# Patient Record
(demographics unavailable — no encounter records)

---

## 2024-10-22 NOTE — HISTORY OF PRESENT ILLNESS
[FreeTextEntry1] : Patient is a 80-year-old female who presents in my office today for chronic low back pain as well as paresthesia involving her bilateral feet.  Patient reports symptoms increased December 2023.  Patient reports she followed up with a neurologist and underwent nerve conduction studies as well as nerve biopsies to the best of her knowledge results were unremarkable.  She reports that she was diagnosed with idiopathic peripheral neuropathy and was placed on gabapentin.  She reports some improvement with gabapentin.  Patient reports she underwent lumbar PRATIK this past spring which gave her some temporary relief.  Patient also reports that she was evaluated by Dr. Church MRI studies of the lumbar spine were obtained surgery was discussed however patient declines that option at this time.  She is currently under the care of a chiropractor and is undergoing spinal decompression which she also reports is providing some relief.  Patient continues to complain of low back pain, with paresthesia involving her bilateral feet.  She reports symptoms are aggravated with prolonged sitting standing and ambulation.  Patient rates her pain level at a 4 out of 10.  Patient presents to my office today for evaluation of acupuncture for treatment of her LS and peripheral neuropathy complaints.

## 2024-10-22 NOTE — PHYSICAL EXAM
[FreeTextEntry1] : EXAMINATION OF LUMBAR SPINE & LOWER EXTREMITIES:   No gross atrophy   Reflexes (R) L/E:                   Quadriceps 2                   Achilles diminshed  Reflexes (L) L/E:                    Quadriceps 2                    Achilles diminished    Sensory L/E: decrease stocking distribution involving the bilateral feet.       Good peripheral Pulses Bilateral L/E No skin changes No atrophy to the intrinsic musculature of the feet noted Tinel's and ankle is negative bilaterally Testing: Patricks (R) [- ]               Patricks (L) [- ]               Babinski [ down going bilaterally]               Chaddock [- bilaterally]               Oppenheim [ -bilaterally]               Gonda [- bilaterally]               Clonus [- ankle bilaterally]               Leseagues (R) [- ]               Leseagues (L) [- ]               Kemps [- ]  SI Jt Lig.Challenege Test (R) -  SI Jt Lig.Challenege Test (L) - S.L.R. ( R ) -60 degrees with tight hamstrings S.L.R. ( L )-60 degrees with tight hamstrings  Range of motion testing was performed with the use of a goniometer.                           Flexion: 55 degrees (normal - 75-90)                           Extension: 15 degrees (normal - 30)                           Lateral Bending ( R ): 30 degrees (normal - 35)                           Lateral Bending ( L ): 25 degrees (normal - 35)                           Thoracic Rotation ( R ):30 degrees (normal - 35)                           Thoracic Rotation ( L ): 30 degrees (normal - 35)                           Internal Rotation Femur ( R ): WNL                           External Rotation Femur ( R ): WNL                           Internal Rotation Femur ( L ):WNL                           External Rotation Femur ( L ): WNL  Normal gait Patient able to perform total heel ambulation. Rhomberg test negative Vibratory: intact Proprioception: intact MMT: intact  Palpation of the Lumbar Spine: Tenderness involving the L4/L5 and L5/S1 interspace. Tenderness and moderate spasm involving the bilateral lower lumbar paraspinal musculature. Trigger points involving the bilateral gluteal musculature.

## 2024-10-22 NOTE — END OF VISIT
[FreeTextEntry3] :  The following information has been documented by Keke Medina acting as a scribe. The documentation recorded by the scribe, in my presence, accurately reflects the service I, Dr. Hernandez, personally performed, and the decisions made by me with my edits as appropriate.  This note was generated by using Dragon medical dictation software. A reasonable effort has been made for proofreading its contents, but typos may still remain. If there are any questions or points of clarification needed, please notify my office.

## 2024-10-22 NOTE — ASSESSMENT
[FreeTextEntry1] : Follow up with Neurologist and continue with Gabapentin as per Neurologist 1xweek/8weeks acupuncture- L/S Goals of which are to decrease pain, dissipate muscle spasm, increase ROM and improve level of function. Home exercise plan reviewed with patient. Stressed the importance for the need for frequent change in position from sit to stand and stand to sit, as well as use of weight shifting techniques to alleviate low back discomfort. Instruction in proper posturing, body mechanics and lifting techniques. Recheck in 4 to 6 weeks At least 60 minutes was spent with patient face to face examining patient, reviewing findings/results, counseling patient and coordinating treatment program. Ample time was provided to answer any questions or address concerns to the patients satisfaction.

## 2024-11-19 NOTE — PROCEDURE
[de-identified] : Acupuncture: Baldry technique with multiple needle placement and UV lamp to the lumbar paraspinal and gluteal musculature x 45 minutes Paralumbar chain (Bladder meridian) with ES x 45 minutes LR 3- Ba Sherman GB 41-Ba Sherman with ES x 45 minutes BL 10 SP 6 ST 36 LR 3 BL 59 Patient tolerated procedure well

## 2024-12-06 NOTE — PROCEDURE
[de-identified] : Acupuncture: Baldry technique with multiple needle placement and UV lamp to the lumbar paraspinal and gluteal musculature x 45 minutes Paralumbar chain (Bladder meridian) with ES x 45 minutes LR 3- Ba Sherman GB 41-Ba Sherman with ES x 45 minutes BL 10 SP 6 ST 36 LR 3 BL 59 Patient tolerated procedure well

## 2024-12-13 NOTE — PROCEDURE
[de-identified] : Acupuncture: Baldry technique with multiple needle placement and UV lamp to the lumbar paraspinal and gluteal musculature x 45 minutes Paralumbar chain (Bladder meridian) with ES x 45 minutes LR 3- Ba Sherman GB 41-Ba Sherman with ES x 45 minutes BL 10 SP 6 ST 36 LR 3 BL 59 Patient tolerated procedure well.

## 2024-12-13 NOTE — PROCEDURE
[de-identified] : Acupuncture: Baldry technique with multiple needle placement and UV lamp to the lumbar paraspinal and gluteal musculature x 45 minutes Paralumbar chain (Bladder meridian) with ES x 45 minutes LR 3- Ba Sherman GB 41-Ba Sherman with ES x 45 minutes BL 10 SP 6 ST 36 LR 3 BL 59 Patient tolerated procedure well

## 2024-12-13 NOTE — PROCEDURE
[de-identified] : Acupuncture: Baldry technique with multiple needle placement and UV lamp to the lumbar paraspinal and gluteal musculature x 45 minutes Paralumbar chain (Bladder meridian) with ES x 45 minutes LR 3- Ba Sherman GB 41-Ba Sherman with ES x 45 minutes BL 10 SP 6 ST 36 LR 3 BL 59 Patient tolerated procedure well.

## 2024-12-27 NOTE — PROCEDURE
[de-identified] : Acupuncture: Baldry technique with multiple needle placement and UV lamp to the lumbar paraspinal and gluteal musculature x 45 minutes Paralumbar chain (Bladder meridian) with ES x 45 minutes LR 3- Ba Sherman GB 41-Ba Sherman with ES x 45 minutes BL 10 SP 6 ST 36 LR 3 BL 59 Patient tolerated procedure well

## 2025-03-05 NOTE — PHYSICAL EXAM
[Chaperone Present] : A chaperone was present in the examining room during all aspects of the physical examination [No Acute Distress] : in no acute distress [Oriented x3] : oriented to person, place, and time [No Edema] : ~T edema was not present [Warm and Dry] : was warm and dry to touch [Normal Gait] : gait was normal [Normal Appearance] : general appearance was normal [Atrophy] : atrophy [2] : 2 [Aa ____] : Aa [unfilled] [Ba ____] : Ba [unfilled] [C ____] : C [unfilled] [GH ____] : GH [unfilled] [PB ____] : PB [unfilled] [TVL ____] : TVL  [unfilled] [Ap ____] : Ap [unfilled] [Bp ____] : Bp [unfilled] [D ____] : D [unfilled] [Normal] : no abnormalities [Post Void Residual ____ml] : post void residual was [unfilled] ml [FreeTextEntry2] : Nancy [Cough] : no cough [Tenderness] : ~T no ~M abdominal tenderness observed [Distended] : not distended [Hernia] : no hernia observed [Scar] : no scars

## 2025-03-05 NOTE — OB HISTORY
[Vaginal ___] : [unfilled] vaginal delivery(s) [Approximately ___ (Month)] : the LMP was approximately [unfilled] month(s) ago [Last Pap Smear ___] : date of last pap smear was on [unfilled] [Abnormal Pap Smear] : normal pap smear [Taking Estrogens] : is not taking estrogen replacement [Sexually Active] : is not sexually active [FreeTextEntry1] : forceps delivery largest baby: 8lbs 2oz

## 2025-03-05 NOTE — REASON FOR VISIT
[Initial Visit ___] : an initial visit for [unfilled] [Pelvic Organ Prolapse] : pelvic organ prolapse [Urinary Incontinence] : urinary incontinence

## 2025-03-05 NOTE — ADDENDUM
[FreeTextEntry1] : This note was written by Kizzy Monet, acting as the  for Dr. Gastelum. This note accurately reflects the work and decisions made by Dr. Gastelum.

## 2025-03-05 NOTE — LETTER BODY
[I had the pleasure of evaluating your patient, [unfilled]. Thank you for referring Ms. [unfilled] for consultation for ___] : I had the pleasure of evaluating your patient, [unfilled]. Thank you for referring Ms. [unfilled] for consultation for [unfilled]. [Attached please find my note.] : Attached please find my note. [Thank you very much for allowing me to participate in the care of this patient. If you have any questions, please do not hesitate to contact me] : Thank you very much for allowing me to participate in the care of this patient. If you have any questions, please do not hesitate to contact me. [Dear  ___] : Dear  [unfilled], [DrKeyla  ___] : Dr. ROONEY

## 2025-03-05 NOTE — DISCUSSION/SUMMARY
[FreeTextEntry1] : LACI is a 80 year female who presents for prolapse and mixed UI, found to have moderate midline cystocele on exam today and normal PVR. We talked about the etiology and natural progression of pelvic organ prolapse. We discussed the treatment options of a pessary, physical therapy or surgery. In terms of surgery we talked about open procedures, robotic assisted procedures and vaginal reconstruction with or without the utilization of graft material. We also discussed what is involved in recovery, post-op restrictions including restrictions on lifting, submerging in water and exercise. We discussed the difference between mesh augmented repairs and native tissue repairs.  We also discussed abdominal mesh versus vaginal mesh. I recommended bladder testing UDTs if she deisres surgery. I also recommended a pelvic U/S. She would like to try PT so I referred her to [facility]. I sent her urine to the lab. I was able to answer all of her questions.   [] U/A C+S [] Pelvic U/S [] Referral to [facility] f/u 3-4 months  All questions answered.

## 2025-03-05 NOTE — HISTORY OF PRESENT ILLNESS
[FreeTextEntry1] : LACI is a 80 year female who presents for prolapse and leaking a year. Has been leaking for about a year and then it started get worse. She noticed the vaignal protrusion a few months ago. Went to GYN and told she has dropped bladder.  Was taking Methotrexate for a few months for RZ, now coming off and it and taking hydroxyplaquenil.     Daytime frequency: denies Nocturia: 1 Urinary urgency: yes  Leakage of urine with urgency: occasionally Leakage of urine with coughing sneezing laughing: yes Incontinence pad use: denies Sensation of incomplete bladder emptying: sometimes History of frequent urinary tract infections: denies History of hematuria: denies Previous treatment: denies Vaginal symptoms: prolapse Bowel symptoms: constipated

## 2025-05-16 NOTE — ADDENDUM
[FreeTextEntry1] : This note was written by Kizzy Monet, acting as the  for Dr. Gastelum. This note accurately reflects the work and decisions made by Dr. Gastelum.
[FreeTextEntry1] : This note was written by Kizzy Monte, acting as the  for Dr. Gastelum. This note accurately reflects the work and decisions made by Dr. Gastelum.
no

## 2025-05-16 NOTE — DISCUSSION/SUMMARY
[FreeTextEntry1] : Ms. PRITCHARD presented to the office today for counseling regarding her decision for possible pelvic reconstructive surgery. All pertinent prior studies including urodynamic were reviewed. 						 The patient was counseled regarding alternative non-surgical therapies as well as the prognosis with no intervention.  The patient was advised regarding various surgical options including abdominal, robotic/laparascopic and vaginal approaches. The risks and benefits of surgery using endogenous tissue only versus the use of graft insertion (biologic graft) versus permanent mesh were fully reviewed.  She was informed of the inherent risk of these surgeries including but not limited to erosion, infection, chronic inflammation, acute and chronic pain, pain with intercourse (both of which may be refractory to treatment) fistula, disturbance in bowel or bladder function, any of which may require additional surgery for graft revision.  She is aware that there is no graft to be used in her surgery for the vaginal reconstruction and a permanent mesh for the sling for the leaking of urine.  The patient was advised regarding the July 2011 FDA notification regarding these issues and provided the website address for further reference www.fda.gov <http://www.fda.gov/>.    The general risks of the surgery were reviewed including, but not limited to infection, bleeding, including transfusion, surrounding organ or tissue injury (bladder, rectum, bowel, urethra, ureters, nerves vessels or muscles), failure meaning recurrent prolapse, leaking, voiding dysfunction, needing to go home with a catheter, pain with sex, blood clots, and anesthesia.  The approximate length of the surgery, hospital stay and postoperative recovery period were reviewed, including a general overview of convalescence and postoperative followup.  The patient is aware that learner's (medical students/residents/fellows) may be participating in a pelvic exam under anesthesia.  She verbalized a desire to proceed with the surgery.  Appropriate informed consent was obtained anterior repair, sling and cysto.  All questions were answered to the patient's satisfaction.

## 2025-05-16 NOTE — HISTORY OF PRESENT ILLNESS
[FreeTextEntry1] : LACI is a 80 year female who presents with her  for f/u on POP and JARON. Pt has moderate midline cystocele. Here to discuss ANTERIOR CYSTO SLING scheduled for 06/10/2025. Referred to [facility] PT. Prolapse currently managed with RS w/ knob #3. She has been happy with the pessary and feels her symptoms have almost fully resolved.  She does feel the bulge coming down but it is not coming down as far as it was without the pessary.  She does report improvement in her leakage as well. We discussed risks of long-term use of the pessary as well as the maintenance involved.  She is still considering that as an option for her.   UDS with +SHEN, no DO on 04/25/2025  TVUS 03/06/2025 - Small endometrial fluid. Ovaries nonvisualized. Nonspecific dilated left adnexal vessels.  UA w/ micro 03/05/2025 - WNL Negative Urine Cx 03/05/2025

## 2025-05-20 NOTE — HISTORY OF PRESENT ILLNESS
[FreeTextEntry1] : LACI PRITCHARD is a 80 year old female with a past medical history of abn EKG. RBBB. Peripheral neuropathy, RA. HLD. MR. A MRI and CT of her spine did show calcification in the aorta.  Last seen by Dr. Rodríguez 6/2024. In the interim there have been no hospitalizations or procedures. Presents today for cardiac clearance prior to upcoming bladder surgery being done 6/10/25 for bladder prolapse. She denies chest pain, pressure, palpitations, unusual shortness of breath, orthopnea, LE edema, lightheadedness, dizziness, near syncope or syncope. No smoking hx. Walks 1 mile 3-4x a week and also rides recumband bike without exertional complaints.  Testing:  EKG 5/20/25: SR at 66 bpm, NC interval 180 ms, QTc 406 ms, RBBB, possible old inferior infarct, nonspecific ST-T wave abnormalities   Labs 1/2025: Hgb 14, HCT 43.6, plt 200, WBC 4.45, , Chol 195, Trigs 71, HDL 49, Cr 0.68, K 4.4, Na 141, ALT 23, AST 20, Ca 9.9.  Abd u/s 6/2024: CONCLUSIONS: 1. No evidence of abdominal aortic aneurysm. 2. Mild non obstructive atherosclerosis is seen throughout abdominal aorta. 3. No prior exam is available for comparison.   Carotid u/s 2022: BL intimal thickening noted. Right thyroid nodule noted. Patient aware of this and sees endo.  Echo 2022: EF 55-60%. Mild MR. Normal wall motion. Trace to mild TR. Mild NC.

## 2025-05-20 NOTE — DISCUSSION/SUMMARY
[FreeTextEntry1] : LACI PRITCHARD is a 80 year old F who presents today May 20, 2025 with the above history and the following active issues:  Preoperative cardiovascular examination. Patient may proceed with bladder surgery being done 6/10/25 for bladder prolapse. At present, there are no active cardiac conditions.  No recent unstable coronary syndromes, decompensated heart failure, severe valvular heart disease or significant dysrhythmias.   Baseline functional status is acceptable.     The clinical benefit of the proposed procedure outweighs the associated cardiovascular risk.   Risk not attenuated with further CV testing.   Prior testing as outlined above. Optimized from a cardiovascular perspective. DVT ppx   Abn EKG: Active without exertional complaints. Echo, carotid, abd u/s with stable findings.  F/u with Dr. Rodríguez in 2-3 months. Address need for possible lipid lowering therapy at that time.  Ongoing f/u with PCP.  F/U as above. Discussed red flag symptoms, which would warrant sooner or emergent medical evaluation. Any questions and concerns were addressed and resolved.   Sincerely, Melissa Mock [facility] Patient's history, testing, and plan was reviewed with supervising physician, Dr. Patrick Valentino
[FreeTextEntry1] : LACI PRITCHARD is a 80 year old F who presents today May 20, 2025 with the above history and the following active issues:  Preoperative cardiovascular examination. Patient may proceed with bladder surgery being done 6/10/25 for bladder prolapse. At present, there are no active cardiac conditions.  No recent unstable coronary syndromes, decompensated heart failure, severe valvular heart disease or significant dysrhythmias.   Baseline functional status is acceptable.     The clinical benefit of the proposed procedure outweighs the associated cardiovascular risk.   Risk not attenuated with further CV testing.   Prior testing as outlined above. Optimized from a cardiovascular perspective. DVT ppx   Abn EKG: Active without exertional complaints. Echo, carotid, abd u/s with stable findings.  F/u with Dr. Rodríguez in 2-3 months. Address need for possible lipid lowering therapy at that time.  Ongoing f/u with PCP.  F/U as above. Discussed red flag symptoms, which would warrant sooner or emergent medical evaluation. Any questions and concerns were addressed and resolved.   Sincerely, Melissa Mock [facility] Patient's history, testing, and plan was reviewed with supervising physician, Dr. Patrick Valentino
This is a 54 yo male PMH HLD, persistent afib who presents for JO/DCCV.  Pt. endorses SOB/MICHAEL and palpitations.  Denies chest pain/pressure, dizziness, diaphoresis, nausea, vomiting, peripheral edema, recent weight gain, or syncope.

## 2025-05-20 NOTE — HISTORY OF PRESENT ILLNESS
[FreeTextEntry1] : LACI PRITCHARD is a 80 year old female with a past medical history of abn EKG. RBBB. Peripheral neuropathy, RA. HLD. MR. A MRI and CT of her spine did show calcification in the aorta.  Last seen by Dr. Rodríguez 6/2024. In the interim there have been no hospitalizations or procedures. Presents today for cardiac clearance prior to upcoming bladder surgery being done 6/10/25 for bladder prolapse. She denies chest pain, pressure, palpitations, unusual shortness of breath, orthopnea, LE edema, lightheadedness, dizziness, near syncope or syncope. No smoking hx. Walks 1 mile 3-4x a week and also rides recumband bike without exertional complaints.  Testing:  EKG 5/20/25: SR at 66 bpm, KS interval 180 ms, QTc 406 ms, RBBB, possible old inferior infarct, nonspecific ST-T wave abnormalities   Labs 1/2025: Hgb 14, HCT 43.6, plt 200, WBC 4.45, , Chol 195, Trigs 71, HDL 49, Cr 0.68, K 4.4, Na 141, ALT 23, AST 20, Ca 9.9.  Abd u/s 6/2024: CONCLUSIONS: 1. No evidence of abdominal aortic aneurysm. 2. Mild non obstructive atherosclerosis is seen throughout abdominal aorta. 3. No prior exam is available for comparison.   Carotid u/s 2022: BL intimal thickening noted. Right thyroid nodule noted. Patient aware of this and sees endo.  Echo 2022: EF 55-60%. Mild MR. Normal wall motion. Trace to mild TR. Mild KS.

## 2025-05-21 NOTE — PHYSICAL EXAM
[FreeTextEntry1] : EXAMINATION OF THE CERVICAL SPINE AND UPPER EXTREMITIES: Pt is aware and alert and answered all questions cooperatively. Cranial nerve testing was intact.   Smell and taste were not tested.   Visual fields were full.   There was no difficulty with finger to nose response.   Romberg testing was negative.   There was no dysmetria of the upper extremities. Reflexes revealed 2 and symmetrical  Manual muscle testing of the bilateral upper extremities revealed mild weakness with left shoulder flexion and abduction (OA left shoulder) otherwise grossly intact distally Sensory examination revealed sensation was intact. Vibratory and proprioceptive testing were intact.   Peripheral pulses were palpable bilaterally.   Vasquez Test was negative.   Tinels Test was negative at the wrists bilaterally.   The Spurling Cervical Compression Test was negative.   The Adsons Maneuver was negative bilaterally.   No scapular winging was noted.  There was good scapular mobility.  Range of motion testing was performed with the use of a goniometer.  On range of motion testing, flexion was to 45 degrees (normal - 45), extension was to 35 degrees  (normal - 55), right rotation was to 50 degrees  (normal - 70), left rotation was to 45 degrees (normal - 70), right lateral bending was to 15 degrees  (normal - 40), and left lateral bending was to 25 degrees (normal - 40).  On palpation, of the cervical spine there was tenderness and spasm involving the bilateral cervical paraspinal musculature. Tenderness and spasm involving the bilateral levator scapulae musculature. Trigger points bilateral trapezii

## 2025-05-21 NOTE — ASSESSMENT
February 5, 2022     Patient: Svetlana Ceballos   YOB: 1997   Date of Visit: 2/5/2022       To Whom it May Concern:    Svetlana Ceballos was seen in my clinic on 2/5/2022 at 8:45 am.    Please excuse Svetlana for her absence from work on the date listed above to be able to make her appointment.May return to work 2/8/2022.    Sincerely,         Ketty Cevallos MD    Medical information is confidential and cannot be disclosed without the written consent of the patient or her representative.       [FreeTextEntry1] : X-ray cervical spine compare to prior study   PT 2-3xweekly/x6 weeks - C/S MH, ES, DTM - C/S  Trapezii and rhomboid stretch and strengthening  Scapular mobilization Isometrics: C/S Upper extremities PRE'S advanced as tolerated.  Instruction in proper posturing and body mechanics. Instruction in HEP.   Reviewed home exercise program with patient.  Stressed the importance of cervical spine range of motion trap and rhomboid stretching scapular mobilization isometrics to the C-spine, proper posturing and body mechanics as well as ergonomics  Recheck in 4 to 6 weeks    At least 20 minutes was spent with patient face to face examining patient, reviewing findings/results, counseling patient and coordinating treatment program. Ample time was provided to answer any questions or address concerns to the patient's satisfaction.

## 2025-05-21 NOTE — HISTORY OF PRESENT ILLNESS
[FreeTextEntry1] : Patient is a 80-year-old female who presents in my office today for eval of neck pain duration of one month. Pt does not recall any precipitating events prior to onset of neck pain.  Patient denies any radicular symptoms involving upper extremities.  Presents today for evaluation of C-spine pain.